# Patient Record
Sex: MALE | Employment: OTHER | URBAN - METROPOLITAN AREA
[De-identification: names, ages, dates, MRNs, and addresses within clinical notes are randomized per-mention and may not be internally consistent; named-entity substitution may affect disease eponyms.]

---

## 2018-01-01 ENCOUNTER — HOSPITAL ENCOUNTER (INPATIENT)
Age: 83
LOS: 1 days | Discharge: HOSPICE | DRG: 064 | End: 2018-11-29
Attending: EMERGENCY MEDICINE | Admitting: FAMILY MEDICINE
Payer: MEDICARE

## 2018-01-01 ENCOUNTER — HOSPITAL ENCOUNTER (OUTPATIENT)
Dept: MRI IMAGING | Age: 83
Discharge: HOME | End: 2018-11-13
Attending: FAMILY MEDICINE
Payer: MEDICARE

## 2018-01-01 ENCOUNTER — APPOINTMENT (OUTPATIENT)
Dept: CT IMAGING | Age: 83
DRG: 064 | End: 2018-01-01
Attending: EMERGENCY MEDICINE
Payer: MEDICARE

## 2018-01-01 ENCOUNTER — HOME CARE VISIT (OUTPATIENT)
Dept: HOSPICE | Facility: HOSPICE | Age: 83
End: 2018-01-01
Payer: MEDICARE

## 2018-01-01 ENCOUNTER — HOSPITAL ENCOUNTER (INPATIENT)
Age: 83
LOS: 2 days | DRG: 951 | End: 2018-12-01
Attending: INTERNAL MEDICINE | Admitting: INTERNAL MEDICINE
Payer: OTHER MISCELLANEOUS

## 2018-01-01 ENCOUNTER — HOSPICE ADMISSION (OUTPATIENT)
Dept: HOSPICE | Facility: HOSPICE | Age: 83
End: 2018-01-01
Payer: MEDICARE

## 2018-01-01 VITALS
SYSTOLIC BLOOD PRESSURE: 117 MMHG | HEART RATE: 115 BPM | TEMPERATURE: 100.2 F | OXYGEN SATURATION: 94 % | DIASTOLIC BLOOD PRESSURE: 59 MMHG | RESPIRATION RATE: 14 BRPM

## 2018-01-01 VITALS
HEART RATE: 71 BPM | RESPIRATION RATE: 14 BRPM | DIASTOLIC BLOOD PRESSURE: 98 MMHG | SYSTOLIC BLOOD PRESSURE: 192 MMHG | OXYGEN SATURATION: 91 %

## 2018-01-01 DIAGNOSIS — R40.0 SOMNOLENCE: ICD-10-CM

## 2018-01-01 DIAGNOSIS — Z51.5 COMFORT MEASURES ONLY STATUS: ICD-10-CM

## 2018-01-01 DIAGNOSIS — R53.81 DEBILITY: ICD-10-CM

## 2018-01-01 DIAGNOSIS — S32.474A: ICD-10-CM

## 2018-01-01 DIAGNOSIS — I61.9 RIGHT-SIDED NONTRAUMATIC INTRACEREBRAL HEMORRHAGE, UNSPECIFIED CEREBRAL LOCATION (HCC): Primary | ICD-10-CM

## 2018-01-01 PROCEDURE — 74011250636 HC RX REV CODE- 250/636: Performed by: NURSE PRACTITIONER

## 2018-01-01 PROCEDURE — 72195 MRI PELVIS W/O DYE: CPT

## 2018-01-01 PROCEDURE — 77030018846 HC SOL IRR STRL H20 ICUM -A

## 2018-01-01 PROCEDURE — 74011000250 HC RX REV CODE- 250: Performed by: INTERNAL MEDICINE

## 2018-01-01 PROCEDURE — 74011250636 HC RX REV CODE- 250/636: Performed by: PHYSICAL MEDICINE & REHABILITATION

## 2018-01-01 PROCEDURE — 65270000032 HC RM SEMIPRIVATE

## 2018-01-01 PROCEDURE — 74011000258 HC RX REV CODE- 258: Performed by: PHYSICAL MEDICINE & REHABILITATION

## 2018-01-01 PROCEDURE — 0656 HSPC GENERAL INPATIENT

## 2018-01-01 PROCEDURE — 3336500001 HSPC ELECTION

## 2018-01-01 PROCEDURE — 70450 CT HEAD/BRAIN W/O DYE: CPT

## 2018-01-01 PROCEDURE — 74011250636 HC RX REV CODE- 250/636: Performed by: INTERNAL MEDICINE

## 2018-01-01 PROCEDURE — G0299 HHS/HOSPICE OF RN EA 15 MIN: HCPCS

## 2018-01-01 PROCEDURE — 74011000258 HC RX REV CODE- 258: Performed by: INTERNAL MEDICINE

## 2018-01-01 PROCEDURE — 99284 EMERGENCY DEPT VISIT MOD MDM: CPT

## 2018-01-01 PROCEDURE — 74011250637 HC RX REV CODE- 250/637: Performed by: INTERNAL MEDICINE

## 2018-01-01 RX ORDER — MORPHINE SULFATE 2 MG/ML
2 INJECTION, SOLUTION INTRAMUSCULAR; INTRAVENOUS EVERY 4 HOURS
Status: DISCONTINUED | OUTPATIENT
Start: 2018-01-01 | End: 2018-01-01

## 2018-01-01 RX ORDER — ALBUTEROL SULFATE 0.83 MG/ML
2.5 SOLUTION RESPIRATORY (INHALATION)
Status: DISCONTINUED | OUTPATIENT
Start: 2018-01-01 | End: 2018-01-01 | Stop reason: HOSPADM

## 2018-01-01 RX ORDER — DULOXETIN HYDROCHLORIDE 60 MG/1
60 CAPSULE, DELAYED RELEASE ORAL DAILY
COMMUNITY
End: 2018-01-01

## 2018-01-01 RX ORDER — ACETAMINOPHEN 500 MG
1000 TABLET ORAL EVERY 12 HOURS
COMMUNITY
End: 2018-01-01

## 2018-01-01 RX ORDER — CYANOCOBALAMIN (VITAMIN B-12) 2000 MCG
2000 TABLET ORAL DAILY
COMMUNITY
End: 2018-01-01

## 2018-01-01 RX ORDER — ACETAMINOPHEN 650 MG/1
650 SUPPOSITORY RECTAL
Status: DISCONTINUED | OUTPATIENT
Start: 2018-01-01 | End: 2018-01-01 | Stop reason: HOSPADM

## 2018-01-01 RX ORDER — TRAZODONE HYDROCHLORIDE 50 MG/1
50 TABLET ORAL
COMMUNITY
End: 2018-01-01

## 2018-01-01 RX ORDER — LORAZEPAM 2 MG/ML
4 INJECTION INTRAMUSCULAR
Status: ACTIVE | OUTPATIENT
Start: 2018-01-01 | End: 2018-01-01

## 2018-01-01 RX ORDER — LORAZEPAM 2 MG/ML
2 INJECTION INTRAMUSCULAR EVERY 4 HOURS
Status: DISCONTINUED | OUTPATIENT
Start: 2018-01-01 | End: 2018-01-01 | Stop reason: HOSPADM

## 2018-01-01 RX ORDER — GLYCOPYRROLATE 0.2 MG/ML
0.2 INJECTION INTRAMUSCULAR; INTRAVENOUS
Status: DISCONTINUED | OUTPATIENT
Start: 2018-01-01 | End: 2018-01-01 | Stop reason: HOSPADM

## 2018-01-01 RX ORDER — GLYCOPYRROLATE 0.2 MG/ML
0.2 INJECTION INTRAMUSCULAR; INTRAVENOUS
Status: DISCONTINUED | OUTPATIENT
Start: 2018-01-01 | End: 2018-01-01

## 2018-01-01 RX ORDER — QUETIAPINE FUMARATE 25 MG/1
25 TABLET, FILM COATED ORAL 2 TIMES DAILY
COMMUNITY
End: 2018-01-01

## 2018-01-01 RX ORDER — SCOLOPAMINE TRANSDERMAL SYSTEM 1 MG/1
1 PATCH, EXTENDED RELEASE TRANSDERMAL
Status: DISCONTINUED | OUTPATIENT
Start: 2018-01-01 | End: 2018-01-01 | Stop reason: HOSPADM

## 2018-01-01 RX ORDER — ONDANSETRON 2 MG/ML
4 INJECTION INTRAMUSCULAR; INTRAVENOUS
Status: DISCONTINUED | OUTPATIENT
Start: 2018-01-01 | End: 2018-01-01 | Stop reason: HOSPADM

## 2018-01-01 RX ORDER — LABETALOL HYDROCHLORIDE 5 MG/ML
10 INJECTION, SOLUTION INTRAVENOUS
Status: DISCONTINUED | OUTPATIENT
Start: 2018-01-01 | End: 2018-01-01

## 2018-01-01 RX ORDER — FACIAL-BODY WIPES
10 EACH TOPICAL DAILY PRN
Status: DISCONTINUED | OUTPATIENT
Start: 2018-01-01 | End: 2018-01-01 | Stop reason: HOSPADM

## 2018-01-01 RX ORDER — LORAZEPAM 2 MG/ML
1 INJECTION INTRAMUSCULAR
Status: DISCONTINUED | OUTPATIENT
Start: 2018-01-01 | End: 2018-01-01 | Stop reason: HOSPADM

## 2018-01-01 RX ORDER — MORPHINE SULFATE 2 MG/ML
4 INJECTION, SOLUTION INTRAMUSCULAR; INTRAVENOUS
Status: DISCONTINUED | OUTPATIENT
Start: 2018-01-01 | End: 2018-01-01 | Stop reason: HOSPADM

## 2018-01-01 RX ORDER — LORAZEPAM 2 MG/ML
2 INJECTION INTRAMUSCULAR
Status: DISCONTINUED | OUTPATIENT
Start: 2018-01-01 | End: 2018-01-01 | Stop reason: HOSPADM

## 2018-01-01 RX ORDER — BISOPROLOL FUMARATE 5 MG/1
2.5 TABLET ORAL DAILY
COMMUNITY
End: 2018-01-01

## 2018-01-01 RX ORDER — KETOROLAC TROMETHAMINE 30 MG/ML
30 INJECTION, SOLUTION INTRAMUSCULAR; INTRAVENOUS
Status: DISCONTINUED | OUTPATIENT
Start: 2018-01-01 | End: 2018-01-01 | Stop reason: HOSPADM

## 2018-01-01 RX ORDER — MORPHINE SULFATE 10 MG/ML
2 INJECTION, SOLUTION INTRAMUSCULAR; INTRAVENOUS
Status: DISCONTINUED | OUTPATIENT
Start: 2018-01-01 | End: 2018-01-01 | Stop reason: HOSPADM

## 2018-01-01 RX ORDER — SODIUM CHLORIDE 0.9 % (FLUSH) 0.9 %
10 SYRINGE (ML) INJECTION AS NEEDED
Status: DISCONTINUED | OUTPATIENT
Start: 2018-01-01 | End: 2018-01-01 | Stop reason: HOSPADM

## 2018-01-01 RX ORDER — MORPHINE SULFATE 2 MG/ML
2 INJECTION, SOLUTION INTRAMUSCULAR; INTRAVENOUS
Status: DISCONTINUED | OUTPATIENT
Start: 2018-01-01 | End: 2018-01-01

## 2018-01-01 RX ORDER — MORPHINE SULFATE 2 MG/ML
4 INJECTION, SOLUTION INTRAMUSCULAR; INTRAVENOUS EVERY 4 HOURS
Status: DISCONTINUED | OUTPATIENT
Start: 2018-01-01 | End: 2018-01-01 | Stop reason: HOSPADM

## 2018-01-01 RX ORDER — SODIUM CHLORIDE 0.9 % (FLUSH) 0.9 %
SYRINGE (ML) INJECTION
Status: COMPLETED
Start: 2018-01-01 | End: 2018-01-01

## 2018-01-01 RX ORDER — GLYCOPYRROLATE 0.2 MG/ML
0.2 INJECTION INTRAMUSCULAR; INTRAVENOUS EVERY 4 HOURS
Status: DISCONTINUED | OUTPATIENT
Start: 2018-01-01 | End: 2018-01-01 | Stop reason: HOSPADM

## 2018-01-01 RX ORDER — DEXAMETHASONE SODIUM PHOSPHATE 4 MG/ML
4 INJECTION, SOLUTION INTRA-ARTICULAR; INTRALESIONAL; INTRAMUSCULAR; INTRAVENOUS; SOFT TISSUE EVERY 8 HOURS
Status: DISCONTINUED | OUTPATIENT
Start: 2018-01-01 | End: 2018-01-01 | Stop reason: HOSPADM

## 2018-01-01 RX ORDER — SODIUM CHLORIDE 0.9 % (FLUSH) 0.9 %
10 SYRINGE (ML) INJECTION
Status: DISCONTINUED | OUTPATIENT
Start: 2018-01-01 | End: 2018-01-01

## 2018-01-01 RX ORDER — ALENDRONATE SODIUM 70 MG/1
70 TABLET ORAL
COMMUNITY
End: 2018-01-01

## 2018-01-01 RX ORDER — CHOLECALCIFEROL (VITAMIN D3) 125 MCG
2000 CAPSULE ORAL DAILY
COMMUNITY
End: 2018-01-01

## 2018-01-01 RX ADMIN — GLYCOPYRROLATE 0.2 MG: 0.2 INJECTION, SOLUTION INTRAMUSCULAR; INTRAVENOUS at 15:24

## 2018-01-01 RX ADMIN — LORAZEPAM 2 MG: 2 INJECTION INTRAMUSCULAR; INTRAVENOUS at 23:27

## 2018-01-01 RX ADMIN — LEVETIRACETAM 500 MG: 100 INJECTION, SOLUTION INTRAVENOUS at 12:20

## 2018-01-01 RX ADMIN — DEXAMETHASONE SODIUM PHOSPHATE 4 MG: 4 INJECTION, SOLUTION INTRAMUSCULAR; INTRAVENOUS at 15:27

## 2018-01-01 RX ADMIN — MORPHINE SULFATE 2 MG: 2 INJECTION, SOLUTION INTRAMUSCULAR; INTRAVENOUS at 08:56

## 2018-01-01 RX ADMIN — LORAZEPAM 2 MG: 2 INJECTION INTRAMUSCULAR; INTRAVENOUS at 16:59

## 2018-01-01 RX ADMIN — LORAZEPAM 2 MG: 2 INJECTION INTRAMUSCULAR; INTRAVENOUS at 07:45

## 2018-01-01 RX ADMIN — MORPHINE SULFATE 2 MG: 2 INJECTION, SOLUTION INTRAMUSCULAR; INTRAVENOUS at 06:55

## 2018-01-01 RX ADMIN — MORPHINE SULFATE 4 MG: 2 INJECTION, SOLUTION INTRAMUSCULAR; INTRAVENOUS at 15:25

## 2018-01-01 RX ADMIN — MORPHINE SULFATE 2 MG: 2 INJECTION, SOLUTION INTRAMUSCULAR; INTRAVENOUS at 01:04

## 2018-01-01 RX ADMIN — MORPHINE SULFATE 2 MG: 2 INJECTION, SOLUTION INTRAMUSCULAR; INTRAVENOUS at 21:17

## 2018-01-01 RX ADMIN — MORPHINE SULFATE 2 MG: 2 INJECTION, SOLUTION INTRAMUSCULAR; INTRAVENOUS at 08:26

## 2018-01-01 RX ADMIN — LORAZEPAM 2 MG: 2 INJECTION INTRAMUSCULAR; INTRAVENOUS at 05:13

## 2018-01-01 RX ADMIN — MORPHINE SULFATE 4 MG: 2 INJECTION, SOLUTION INTRAMUSCULAR; INTRAVENOUS at 19:10

## 2018-01-01 RX ADMIN — GLYCOPYRROLATE 0.2 MG: 0.2 INJECTION, SOLUTION INTRAMUSCULAR; INTRAVENOUS at 07:03

## 2018-01-01 RX ADMIN — GLYCOPYRROLATE 0.2 MG: 0.2 INJECTION, SOLUTION INTRAMUSCULAR; INTRAVENOUS at 23:06

## 2018-01-01 RX ADMIN — MORPHINE SULFATE 2 MG: 2 INJECTION, SOLUTION INTRAMUSCULAR; INTRAVENOUS at 05:13

## 2018-01-01 RX ADMIN — GLYCOPYRROLATE 0.2 MG: 0.2 INJECTION, SOLUTION INTRAMUSCULAR; INTRAVENOUS at 23:27

## 2018-01-01 RX ADMIN — MORPHINE SULFATE 4 MG: 2 INJECTION, SOLUTION INTRAMUSCULAR; INTRAVENOUS at 22:03

## 2018-01-01 RX ADMIN — LORAZEPAM 2 MG: 2 INJECTION INTRAMUSCULAR; INTRAVENOUS at 14:22

## 2018-01-01 RX ADMIN — Medication 10 ML: at 06:55

## 2018-01-01 RX ADMIN — MORPHINE SULFATE 2 MG: 2 INJECTION, SOLUTION INTRAMUSCULAR; INTRAVENOUS at 02:57

## 2018-01-01 RX ADMIN — GLYCOPYRROLATE 0.2 MG: 0.2 INJECTION, SOLUTION INTRAMUSCULAR; INTRAVENOUS at 03:31

## 2018-01-01 RX ADMIN — MORPHINE SULFATE 4 MG: 2 INJECTION, SOLUTION INTRAMUSCULAR; INTRAVENOUS at 16:59

## 2018-01-01 RX ADMIN — KETOROLAC TROMETHAMINE 30 MG: 30 INJECTION, SOLUTION INTRAMUSCULAR at 04:50

## 2018-01-01 RX ADMIN — MORPHINE SULFATE 4 MG: 2 INJECTION, SOLUTION INTRAMUSCULAR; INTRAVENOUS at 12:24

## 2018-01-01 RX ADMIN — LORAZEPAM 2 MG: 2 INJECTION INTRAMUSCULAR; INTRAVENOUS at 23:06

## 2018-01-01 RX ADMIN — MORPHINE SULFATE 4 MG: 2 INJECTION, SOLUTION INTRAMUSCULAR; INTRAVENOUS at 06:22

## 2018-01-01 RX ADMIN — MORPHINE SULFATE 2 MG: 2 INJECTION, SOLUTION INTRAMUSCULAR; INTRAVENOUS at 07:44

## 2018-01-01 RX ADMIN — GLYCOPYRROLATE 0.2 MG: 0.2 INJECTION, SOLUTION INTRAMUSCULAR; INTRAVENOUS at 04:00

## 2018-01-01 RX ADMIN — LORAZEPAM 2 MG: 2 INJECTION INTRAMUSCULAR; INTRAVENOUS at 15:25

## 2018-01-01 RX ADMIN — Medication 10 ML: at 07:03

## 2018-01-01 RX ADMIN — LORAZEPAM 2 MG: 2 INJECTION INTRAMUSCULAR; INTRAVENOUS at 12:17

## 2018-01-01 RX ADMIN — LORAZEPAM 2 MG: 2 INJECTION INTRAMUSCULAR; INTRAVENOUS at 08:25

## 2018-01-01 RX ADMIN — Medication 10 ML: at 19:04

## 2018-01-01 RX ADMIN — GLYCOPYRROLATE 0.2 MG: 0.2 INJECTION, SOLUTION INTRAMUSCULAR; INTRAVENOUS at 07:44

## 2018-01-01 RX ADMIN — LORAZEPAM 2 MG: 2 INJECTION INTRAMUSCULAR; INTRAVENOUS at 06:55

## 2018-01-01 RX ADMIN — MORPHINE SULFATE 2 MG: 2 INJECTION, SOLUTION INTRAMUSCULAR; INTRAVENOUS at 05:43

## 2018-01-01 RX ADMIN — LORAZEPAM 2 MG: 2 INJECTION INTRAMUSCULAR; INTRAVENOUS at 13:32

## 2018-01-01 RX ADMIN — LORAZEPAM 2 MG: 2 INJECTION INTRAMUSCULAR; INTRAVENOUS at 12:24

## 2018-01-01 RX ADMIN — MORPHINE SULFATE 2 MG: 10 INJECTION INTRAVENOUS at 12:31

## 2018-01-01 RX ADMIN — Medication 10 ML: at 07:45

## 2018-01-01 RX ADMIN — LORAZEPAM 2 MG: 2 INJECTION INTRAMUSCULAR; INTRAVENOUS at 10:05

## 2018-01-01 RX ADMIN — MORPHINE SULFATE 2 MG: 2 INJECTION, SOLUTION INTRAMUSCULAR; INTRAVENOUS at 23:06

## 2018-01-01 RX ADMIN — LORAZEPAM 2 MG: 2 INJECTION INTRAMUSCULAR; INTRAVENOUS at 13:38

## 2018-01-01 RX ADMIN — MORPHINE SULFATE 2 MG: 2 INJECTION, SOLUTION INTRAMUSCULAR; INTRAVENOUS at 10:05

## 2018-01-01 RX ADMIN — LORAZEPAM 2 MG: 2 INJECTION INTRAMUSCULAR; INTRAVENOUS at 19:10

## 2018-01-01 RX ADMIN — Medication 10 ML: at 05:43

## 2018-01-01 RX ADMIN — MORPHINE SULFATE 2 MG: 2 INJECTION, SOLUTION INTRAMUSCULAR; INTRAVENOUS at 09:33

## 2018-01-01 RX ADMIN — MORPHINE SULFATE 2 MG: 2 INJECTION, SOLUTION INTRAMUSCULAR; INTRAVENOUS at 06:21

## 2018-01-01 RX ADMIN — LORAZEPAM 2 MG: 2 INJECTION INTRAMUSCULAR; INTRAVENOUS at 05:43

## 2018-01-01 RX ADMIN — LORAZEPAM 2 MG: 2 INJECTION INTRAMUSCULAR; INTRAVENOUS at 19:03

## 2018-01-01 RX ADMIN — LORAZEPAM 2 MG: 2 INJECTION INTRAMUSCULAR; INTRAVENOUS at 04:00

## 2018-01-01 RX ADMIN — SODIUM CHLORIDE 500 MG: 900 INJECTION, SOLUTION INTRAVENOUS at 11:40

## 2018-01-01 RX ADMIN — LORAZEPAM 2 MG: 2 INJECTION INTRAMUSCULAR; INTRAVENOUS at 03:31

## 2018-01-01 RX ADMIN — Medication 10 ML: at 22:03

## 2018-01-01 RX ADMIN — LORAZEPAM 2 MG: 2 INJECTION INTRAMUSCULAR; INTRAVENOUS at 07:03

## 2018-01-01 RX ADMIN — MORPHINE SULFATE 4 MG: 2 INJECTION, SOLUTION INTRAMUSCULAR; INTRAVENOUS at 11:30

## 2018-01-01 RX ADMIN — LORAZEPAM 2 MG: 2 INJECTION INTRAMUSCULAR; INTRAVENOUS at 10:52

## 2018-01-01 RX ADMIN — Medication 10 ML: at 06:22

## 2018-01-01 RX ADMIN — LORAZEPAM 2 MG: 2 INJECTION INTRAMUSCULAR; INTRAVENOUS at 11:30

## 2018-01-01 RX ADMIN — SODIUM CHLORIDE 500 MG: 900 INJECTION, SOLUTION INTRAVENOUS at 23:36

## 2018-01-01 RX ADMIN — GLYCOPYRROLATE 0.2 MG: 0.2 INJECTION, SOLUTION INTRAMUSCULAR; INTRAVENOUS at 11:30

## 2018-01-01 RX ADMIN — MORPHINE SULFATE 2 MG: 2 INJECTION, SOLUTION INTRAMUSCULAR; INTRAVENOUS at 19:03

## 2018-01-01 RX ADMIN — MORPHINE SULFATE 2 MG: 2 INJECTION, SOLUTION INTRAMUSCULAR; INTRAVENOUS at 10:52

## 2018-01-01 RX ADMIN — GLYCOPYRROLATE 0.2 MG: 0.2 INJECTION, SOLUTION INTRAMUSCULAR; INTRAVENOUS at 19:10

## 2018-01-01 RX ADMIN — LORAZEPAM 2 MG: 2 INJECTION INTRAMUSCULAR; INTRAVENOUS at 08:56

## 2018-01-01 RX ADMIN — MORPHINE SULFATE 4 MG: 2 INJECTION, SOLUTION INTRAMUSCULAR; INTRAVENOUS at 02:12

## 2018-01-01 RX ADMIN — MORPHINE SULFATE 2 MG: 2 INJECTION, SOLUTION INTRAMUSCULAR; INTRAVENOUS at 04:01

## 2018-01-01 RX ADMIN — LORAZEPAM 2 MG: 2 INJECTION INTRAMUSCULAR; INTRAVENOUS at 06:21

## 2018-01-01 RX ADMIN — KETOROLAC TROMETHAMINE 30 MG: 30 INJECTION, SOLUTION INTRAMUSCULAR at 12:24

## 2018-01-01 RX ADMIN — SODIUM CHLORIDE 500 MG: 900 INJECTION, SOLUTION INTRAVENOUS at 00:02

## 2018-01-01 RX ADMIN — MORPHINE SULFATE 4 MG: 2 INJECTION, SOLUTION INTRAMUSCULAR; INTRAVENOUS at 13:32

## 2018-01-01 RX ADMIN — MORPHINE SULFATE 2 MG: 2 INJECTION, SOLUTION INTRAMUSCULAR; INTRAVENOUS at 04:43

## 2018-01-01 RX ADMIN — LORAZEPAM 2 MG: 2 INJECTION INTRAMUSCULAR; INTRAVENOUS at 15:24

## 2018-01-01 RX ADMIN — GLYCOPYRROLATE 0.2 MG: 0.2 INJECTION, SOLUTION INTRAMUSCULAR; INTRAVENOUS at 19:03

## 2018-01-01 RX ADMIN — MORPHINE SULFATE 4 MG: 2 INJECTION, SOLUTION INTRAMUSCULAR; INTRAVENOUS at 14:22

## 2018-01-01 RX ADMIN — ONDANSETRON 4 MG: 2 INJECTION, SOLUTION INTRAMUSCULAR; INTRAVENOUS at 12:31

## 2018-01-01 RX ADMIN — MORPHINE SULFATE 2 MG: 2 INJECTION, SOLUTION INTRAMUSCULAR; INTRAVENOUS at 16:13

## 2018-01-01 RX ADMIN — Medication 10 ML: at 23:06

## 2018-01-01 RX ADMIN — LORAZEPAM 2 MG: 2 INJECTION INTRAMUSCULAR; INTRAVENOUS at 09:33

## 2018-01-01 RX ADMIN — LORAZEPAM 2 MG: 2 INJECTION INTRAMUSCULAR; INTRAVENOUS at 04:50

## 2018-11-29 PROBLEM — I61.9 ICH (INTRACEREBRAL HEMORRHAGE) (HCC): Status: ACTIVE | Noted: 2018-01-01

## 2018-11-29 NOTE — ED TRIAGE NOTES
Triage note: Patient arrives via EMS from [facility] due to left sided weakness and increase confusion. Called Shree prior to patients arrival, spoke with Coty, she reports LKW at 1830 last night. Reports patients baseline is alert and oriented to person only, recognized family, disoriented to place and time. Also reports  When he woke up at 8am this morning speech slurred and left sided weakness. BGL by .

## 2018-11-29 NOTE — ED PROVIDER NOTES
80 y.o. male with past medical history significant for hypertension, hypercholesterolemia, and skin cancer who presents from [address] via EMS with chief complaint of weakness. EMS states 2 hours ago pt had left sided weakness and was minimally responsive at his facility. EMS states pt's last known well was last night. EMS states pt's BS was 107 with a BP of 104/82. EMS states pt has left sided weakness, is not responding appropriately with unequal pupils. Pt is on 2.5mg of Eliquis and his last dose was last night at 2026. Pt has a DNR. There are no other acute medical concerns at this time. Full history, physical exam, and ROS unable to be obtained due to: Altered mental state. Social hx: No tobacco use, no EtOH use PCP: Mellisa Shipley MD 
 
Note written by Gregg Roberto, as dictated by Aylin Laguerre MD 9:38 AM 
 
 
 
 
 
The history is provided by the EMS personnel. The history is limited by the condition of the patient. No  was used. Past Medical History:  
Diagnosis Date  Cancer ([facility] [address] [facility]  Hypertension  Ill-defined condition   
 hypercholesterolemia Past Surgical History:  
Procedure Laterality Date  ABDOMEN SURGERY PROC UNLISTED  2009, 1994  
 bilateral hernia repair  CARDIAC SURG PROCEDURE UNLIST  2008  
 cardiac catheterization  HX HEENT    
 T&A No family history on file. Social History Socioeconomic History  Marital status:  Spouse name: Not on file  Number of children: Not on file  Years of education: Not on file  Highest education level: Not on file Social Needs  Financial resource strain: Not on file  Food insecurity - worry: Not on file  Food insecurity - inability: Not on file  Transportation needs - medical: Not on file  Transportation needs - non-medical: Not on file Occupational History  Not on file Tobacco Use  
  Smoking status: Never Smoker Substance and Sexual Activity  Alcohol use: No  
 Drug use: Not on file  Sexual activity: Not on file Other Topics Concern  Not on file Social History Narrative  Not on file ALLERGIES: Patient has no known allergies. Review of Systems Unable to perform ROS: Mental status change There were no vitals filed for this visit. Physical Exam  
Physical Examination: General appearance - awake, nonverbal 
Eyes - pupils equal and reactive, extraocular eye movements intact Neck - supple, no significant adenopathy Chest - clear to auscultation, no wheezes, rales or rhonchi, symmetric air entry Heart - normal rate, regular rhythm, normal S1, S2, no murmurs, rubs, clicks or gallops Abdomen - soft, nontender, nondistended, no masses or organomegaly Back exam - full range of motion, no tenderness, palpable spasm or pain on motion Neurological - awake, nonverbal, follows commands, dense weakness to left arm and leg Musculoskeletal - no joint tenderness, deformity or swelling Extremities - peripheral pulses normal, no pedal edema, no clubbing or cyanosis Skin - normal coloration and turgor, no rashes, no suspicious skin lesions noted MDM Number of Diagnoses or Management Options Right-sided nontraumatic intracerebral hemorrhage, unspecified cerebral location [facility]): Amount and/or Complexity of Data Reviewed Clinical lab tests: ordered and reviewed Tests in the radiology section of CPT®: ordered and reviewed Decide to obtain previous medical records or to obtain history from someone other than the patient: yes Obtain history from someone other than the patient: yes (EMS) Review and summarize past medical records: yes Discuss the patient with other providers: yes (Teleneurology, hospitalist, hospice, palliative care) Independent visualization of images, tracings, or specimens: yes Critical Care Total time providing critical care: 30-74 minutes Patient Progress Patient progress: stable Procedures 9:40 AM 
Tried to call wife, there was no answer. Called the daughter, there was no answer but left a message. Called son, there was no answer but left a message. CONSULT NOTE: 
9:42 AM Derik Duff MD spoke with Dr. Susy Powell, Consult for Tele-Neurology. Discussed available diagnostic tests and clinical findings. Dr. Susy Powell will not evaluate the patient, as he has a head bleed. 9:44 AM 
Talking to daughter in the ED. Daughter expressed wanting palliative and comfort care, no intervention. Will cancel labs and EKG. CONSULT NOTE: 
9:53 AM Derik Duff MD communicated with Dr. Lyla Cogan, Consult for hospitalist via [facility] Text. Discussed available diagnostic tests and clinical findings. Dr. Lyla Cogan will see and admit the pt.  
 
10:05 AM 
Dr. Lyla Cogan is now in ER. Discussion with hospice nurse and family at bedside.

## 2018-11-29 NOTE — PROGRESS NOTES
Admission Medication Reconciliation: 
 
Information obtained from: [facility] medication list updated with information provided by [facility] dated 11/29/18. Summary:  
 
Medications added: all except atorvastatin Medications deleted: bisoprolol-HCTZ, lisinopril Doses changed: atorvastatin 10 mg/day (versus 20 mg/day) Inpatient orders have been reviewed and no changes are needed. Chief Complaint for this Admission:  left-sided weakness, increased confusion Significant PMH/Disease States:  
Past Medical History:  
Diagnosis Date  Cancer ([facility] [address]  Hypertension  Ill-defined condition   
 hypercholesterolemia Allergies:  Patient has no known allergies. Prior to Admission Medications:  
Prior to Admission Medications Prescriptions Last Dose Informant Patient Reported? Taking? DULoxetine (CYMBALTA) 60 mg capsule 11/28/2018  Yes Yes Sig: Take 60 mg by mouth daily. QUEtiapine (SEROQUEL) 25 mg tablet 11/28/2018  Yes Yes Sig: Take 25 mg by mouth two (2) times a day. acetaminophen (TYLENOL) 500 mg tablet 11/28/2018  Yes Yes Sig: Take 1,000 mg by mouth every twelve (12) hours. alendronate (FOSAMAX) 70 mg tablet   Yes Yes Sig: Take 70 mg by mouth every seven (7) days. apixaban (ELIQUIS) 2.5 mg tablet 11/28/2018  Yes Yes Sig: Take 2.5 mg by mouth every twelve (12) hours. atorvastatin (LIPITOR) 10 mg tablet 11/28/2018  Yes Yes Sig: Take 10 mg by mouth nightly. bisoprolol (ZEBETA) 5 mg tablet 11/28/2018  Yes Yes Sig: Take 2.5 mg by mouth daily. (dose = 0.5 x 5 tablet)  
cholecalciferol, vitamin D3, 2,000 unit tab 11/28/2018  Yes Yes Sig: Take 2,000 Units by mouth daily. cyanocobalamin, vitamin B-12, 2,000 mcg tab 11/28/2018  Yes Yes Sig: Take 2,000 mcg by mouth daily. traZODone (DESYREL) 50 mg tablet 11/28/2018  Yes Yes Sig: Take 50 mg by mouth nightly. Facility-Administered Medications: None Thank you for allowing me to participate in the care of this patient. Please contact the pharmacy at  or the medication reconciliation pharmacist at  with any questions. Rudi Prieto, Pharm. D., BCPS, BCPPS

## 2018-11-29 NOTE — HOSPICE
[facility] RN note:  Consult noted. Reviewing chart. Discussed pt with CM. Plan to meet pt shortly. 12:50---In to meet with pt, wife Celeste Crow and [address] Pt appears comfortable post IV ativan, morphine, keppra and zofran. Discussed pt with Dr Gabino Norris (ED MD) and staff RN. Due to rapid decline, potential for seizure and anticipated prognosis of few to many hours, family prefer that pt admit to hospice in the hospital and not disturb pt any further. Approval for inpt admission at [facility] level of care per Dr Jason Ma. Consent forms to be signed with pt's wife. Thank you for the opportunity to care for this pt and family. Please contact hospice at [phone] with any questions or concerns.

## 2018-11-29 NOTE — PROGRESS NOTES
Spiritual Care Assessment/Progress Note [address] 
 
 
NAME: Queen Aminata      MRN: [id number] AGE: 80 y.o. SEX: male Orthodox Affiliation: Jewish  
Language: English  
 
11/29/2018     Total Time (in minutes): 15 Spiritual Assessment begun in [address] DEP through conversation with: 
  
    []Patient        [x] Family    [] Friend(s) Reason for Consult: Emergency Department visit Spiritual beliefs: (Please include comment if needed) [x] Identifies with a analy tradition:     
   [x] Supported by a analy community:        
   [] Claims no spiritual orientation:       
   [] Seeking spiritual identity:            
   [] Adheres to an individual form of spirituality:       
   [] Not able to assess:                   
 
    
Identified resources for coping:  
   [x] Prayer                           
   [] Music                  [] Guided Imagery [x] Family/friends                 [] Pet visits [] Devotional reading                         [] Unknown 
   [] Other:                                          
 
 
Interventions offered during this visit: (See comments for more details) Family/Friend(s): Affirmation of emotions/emotional suffering, Affirmation of analy, Iconic (affirming the presence of God/Higher Power), End of life issues discussed, Normalization of emotional/spiritual concerns, Prayer (assurance of) Plan of Care: 
 
 [] Support spiritual and/or cultural needs  
 [] Support AMD and/or advance care planning process [x] Support grieving process 
 [] Coordinate Rites and/or Rituals  
 [] Coordination with community clergy [] No spiritual needs identified at this time 
 [] Detailed Plan of Care below (See Comments)  [] Make referral to Music Therapy 
[] Make referral to Pet Therapy    
[] Make referral to Addiction services 
[] Make referral to [facility] 
[] Make referral to Spiritual Care Partner [] No future visits requested       
[x] Follow up visits as needed Comments:  responded to staff request for family support. Pt was not responding though family was at bedside. Pt's wife and daughter Parvez Carranza). Family mentioned palliative care support, and that they hoped it would be a smooth transition for pt.  provided pastoral listening, support, and assurance of prayer.  let family know of  availability.  follow up as needed. Wil Ross, [facility] 
 [phone])

## 2018-11-29 NOTE — ROUTINE PROCESS
TRANSFER - OUT REPORT: 
 
Verbal report given to ANDRES Coppola(name) on AZIZA Hopson  being transferred to [facility](unit) for routine progression of care Report consisted of patients Situation, Background, Assessment and  
Recommendations(SBAR). Information from the following report(s) SBAR, Kardex, Intake/Output, MAR, Recent Results and Med Rec Status was reviewed with the receiving nurse. Lines:    
 
Opportunity for questions and clarification was provided.

## 2018-11-29 NOTE — HOSPICE
[facility] RN note:  Pt with increase upper airway secretions. Staff RN to give PRN dose of IV robinul. Will schedule robinul 0.2mg IV every 4 hrs per DR Pipe Zelaya on call for hospce. Family with concern for possible head ache exhibited prior to admission. Family does not want pt to suffer in any way. Pt is restless which could indicate pain. PRN dose of IV morphine now and then scheduled every 4 hrs per Dr Pipe Zelaya. Family gathering at bedside, appreciative of private room. Education provided about end of life process, expressed understanding. [facility] Good Help to Those in Need 
[phone] Inpatient Nursing Admission Patient Name: Taye Sharpe YOB: 1924 Age: 80 y.o. Date of Hospice Admission: 11/29/2018 Hospice Attending Elected by Patient: Lane Krabbe, MD 
Primary Care Physician: Jefe Renteria MD 
Admitting RN: Nadia Dao RN : not present Level of Care (GIP/Routine/Respite): GIP Facility of Care: [facility] Patient Room: 608/02 HOSPICE SUMMARY  
ER Visits/ Hospitalizations in past year: current hospitalization for ICH Hospice Diagnosis: [facility] [I61.9] Onset Date of Hospice Diagnosis: ICH Summary of Disease Progression Leading to Hospice Diagnosis: Pt came to ED for headache and sudden change in mental status. ICH Determined by CT. Wife and family in agreement with admitting pt to hospice for symptom management through end of life. Co-Morbidities:  
Patient Active Problem List  
Diagnosis Code  ICH (intracerebral hemorrhage) ([facility]) I61.9 Diagnoses RELATED to the terminal prognosis: ICH Other Diagnoses: none know Rationale for a prognosis of life expectancy of 6 months or less if the disease follows its normal course (Disease Specific History): Taye Sharpe is a 80 y. o. who was admitted to [facility]. The patient's principle diagnosis of ICH has resulted in unresponsiveness with expected imminent death. Functionally, the patient's Palliative Performance Scale has declined over a period of hours and is estimated at 10%. Objective information that support this patients limited prognosis includes:  
 
Head CT.11/29 IMPRESSION:  
Acute right cerebral parenchymal hemorrhage with a volume of 172.6 mL, 
surrounding edema, mass effect, and leftward midline shift. The patient/family chose comfort measures with the support of Hospice. Patient meets for GIP LOC as evidenced by seizure potential, upper airway secretions, pain (HA) Prognosis estimated based on 11/29/18 clinical assessment is:  
[x] Few to Many Hours [] Hours to Days  
[] Few to Many Days  
[] Days to Weeks  
[] Few to Many Weeks  
[] Weeks to Months  
[] Few to Many Months ASSESSMENT Patient self-reports:  []  Yes    [x] No 
 
SYMPTOMS: seizures, secretions, pain SIGNS/PHYSICAL FINDINGS: unresponsive. IV patent KARNOFSKY: 10% FAST for all dementia:   
 
Learning Assessment: 
Patient  N/A Is patient willing/able to learn? What is the highest level of education completed? Learning preference (written material, demonstration, visual)? Learning barriers (ESOL, [place], poor vision)? Caregiver Is caregiver willing to learn care for patient? yes What is the highest level of education completed? Post graduate Learning preference (written material, demonstration, visual)? demonstration Learning barriers (ESOL, [place], poor vision)?   none CLINICAL INFORMATION Wt Readings from Last 3 Encounters:  
08/11/14 78.5 kg (173 lb 1 oz) 07/30/14 80.7 kg (178 lb) Ht Readings from Last 3 Encounters:  
08/11/14 6' (1.829 m)  
07/30/14 7' 2\" (2.184 m) There is no height or weight on file to calculate BMI. Visit Vitals /89 (BP 1 Location: Left arm, BP Patient Position: At rest) Pulse (!) 116 Temp 98.9 °F (37.2 °C) Resp (!) 32 SpO2 (!) 87% LAB VALUES No results found for this visit on 18 (from the past 12 hour(s)). No results found for this visit on 18 (from the past 6 hour(s)). No results found for: TP, ALBR, TALB, ALB Currently this patient has: 
[] Supplemental O2 [x] Peripheral IV  [] PICC    [] PORT  
[] Zaavla Catheter [] NG Tube   [] PEG Tube [] Ostomy   
[] AICD: Has ICD been deactivated? [] Yes [] No:______ PLAN 1. Admit GIP 2.  Seizure. Scheduled ativan 2mg IV every 4 hrs With PRN availability and 4mg IV rescue for       seizure relief 3. Upper airway secretions: scheduled scopolamine and robinul 0.2mg IV every 4 hrs. 4.  Pain head ache:  Scheduled morphine 2mg IV every 4 hrs with PRN availability Hospice Team Frequency Orders:Skilled Nurse -   Daily x 7 days /every other day x 7 days  with 5 PRN visits for symptom control. DESI  1 visit for initial assessment/evaluation for family support and need for volunteer services. Chad Anthony  1 visit for initial assessment/evaluation for spiritual support. ADVANCE CARE PLANNING (Complete in ACP Flow Sheet) Code Status: DNR Durable DNR: [x]  Yes  []  No 
Code Status Discussed/Confirmed: wish to continue with DNR order Preference for Other Life Sustaining Treatment Discussed/Confirmed:  No life continued treatment Hospitalization Preference: wish to remain in the hospital through end of life  Service: [] Yes  [x]  No      [] Unknown Appropriate for Pinning Ceremony:  [] Yes     [x] No 
Judaism: U[place]din  Home: TBD--probable cremation DISCHARGE PLANNING 1. Discharge Plan: [facility] should pt stabilize 2. Patient/Family teaching: end of life process 3. Response to patient/family teaching: expressed understanding SOCIAL/EMOTIONAL/SPIRITUAL NEEDS Spiritual Issues Identified: Psych/ Social/ Emotional Issues Identified: wife at bedside, responding appropriately to sudden terminal event. Caregiver Support: 
[] Provided information on End of Life Care  
[] Material Provided: Gone From My Sight or Journey's End  
 
CARE COORDINATION Dr. Ivana Trujillo contacted, discharge to hospice order received Dr. Elena Richardson contacted, agrees to serve as attending provider for hospice and provided verbal certification of terminal illness with life expectancy of 6 months or less. Orders for hospice admission, medications and plan of treatment received. Medication reconciliation completed. MEDS: See medication list below DME: Per hospital 
Supplies: Per hospital 
IDT communication to include MD, SN, SW, CH and support team 
 
ALLERGIES AND MEDICATIONS Allergies: No Known Allergies Current Facility-Administered Medications Medication Dose Route Frequency  LORazepam (ATIVAN) injection 2 mg  2 mg IntraVENous Q15MIN PRN  
 ketorolac (TORADOL) injection 30 mg  30 mg IntraVENous Q8H PRN  
 acetaminophen (TYLENOL) suppository 650 mg  650 mg Rectal Q4H PRN  
 scopolamine (TRANSDERM-SCOP) 1 mg over 3 days 1 Patch  1 Patch TransDERmal Q72H  bisacodyl (DULCOLAX) suppository 10 mg  10 mg Rectal DAILY PRN  
 morphine injection 2 mg  2 mg IntraVENous Q15MIN PRN  
 LORazepam (ATIVAN) injection 4 mg  4 mg IntraVENous ONCE PRN  
 [START ON 11/30/2018] levETIRAcetam (KEPPRA) 500 mg in 0.9% sodium chloride 100 mL IVPB  500 mg IntraVENous Q12H  
 LORazepam (ATIVAN) injection 2 mg  2 mg IntraVENous Q4H  
 sodium chloride (NS) flush  glycopyrrolate (ROBINUL) injection 0.2 mg  0.2 mg IntraVENous Q4H  
 morphine injection 2 mg  2 mg IntraVENous Q4H

## 2018-11-29 NOTE — ACP (ADVANCE CARE PLANNING)
Advanced Care Planning Pt has AMD and DDNR in external records under \"media\" scanned today. Primary Decision Maker [facility] Agent): Estella Powell Relationship to patient:Wife 
Phone number: 
[x] Named in a scanned document  
[] Legal Next of Kin 
[] Guardian

## 2018-11-29 NOTE — PROGRESS NOTES
Care Management - Received consult from palliative physician, Dr. Hyacinth Gaspar to send referral to [facility] for inpatient hospice. Sent referral via Geovany Stewart to [facility]. [address] and spoke with intake. She said referral is there. She gave this CM the [facility] liaison's name and number (Annalise at [phone]). Called Annalise. She will come evaluate for inpatient hospice once she receives the information from [facility]. Per chart review, patient has an inpatient admission order, which was written at 10:04 AM. DESI Barker

## 2018-11-29 NOTE — PROGRESS NOTES
Visited Mr Brittnee Mcdonough in ED-15. Patient's wife & daughter were at the bedside and patient was resting with eyes closed. Family stated that patient had been sleeping and resting well. Family denied any needs at that time. Reassured them of ongoing  availability for support. : Rev. Greta Mondragon. Jose Antonio Washington; [facility], to contact [phone] [address] call: [phone]

## 2018-11-29 NOTE — H&P
[employer] Good Help to Those in Need 
[phone] Patient Name: Jacquie Dow YOB: 1924 Date of Provider Hospice Visit: 11/30/18 Level of Care:   [x] General Inpatient (GIP)    [] Routine   [] Respite Current Location of Care: 
[x] [address] [place] [address] [] [facility] [] [id number] [] [facility] [] Hospice [facility], patient referred from: 
[] [address] [place] [address] [] [facility] [] [address] [] [facility] [] Home [] Other:  
 
Date of Original Hospice Admission: 11/30/2018 Hospice Medical Director at time of admission: Dr Sagar Bassett Principle Hospice Diagnosis: ICH Diagnoses RELATED to the terminal prognosis:  Hx of recent pelvic fractures HOSPICE SUMMARY  
  
AZIZA Farris is a 80 y. o. with a past history of cognitive impairment, recent fall earlier this month w/ mult pelvic fractures on eliquis who was admitted on 11/29/2018 from [facility] with [address]. Upon admission family reports he was still able to follow commands, now obtunded. CT head showing acute R cerebral parenchymal hemorrhage w/ edema and midline shift. Family wishes comfort measures. Pt is admitted to [address] [place] [address] level of care due to transitions of care from acute to comfort care, high risk for decline, symptoms of tachycardia, tachypnea, hypertension. Jacquie Dow is a 80y.o. year old who was admitted to [facility]. The patient's principle diagnosis has resulted in MRI 11/29/18 IMPRESSION:  
Acute right cerebral parenchymal hemorrhage with a volume of 172.6 mL, 
surrounding edema, mass effect, and leftward midline shift. Functionally, the patient's Karnofsky and/or Palliative Performance Scale has declined over a period of 1-2 days and is estimated at 10%. The patient is dependent on the following ADLs: he is dependent for all ADLs. Objective information that support this patients limited prognosis includes: The patient/family chose comfort measures with the support of Hospice.  
 
 HOSPICE DIAGNOSES  
 Active Symptoms: 1. Shortness of breath 2. Non verbal pain indicators 3. Seizures 4. Airway secretions PLAN 1. Admit GIP for symptom management, high risk for decline, IV medications given frequently and frequent assessments required, transitions of care 2. Robinul scheduled 0.2 every 4 hours 3. Keppra  500 mg IV every 12 hours 4. Ativan 2mg every 4 hours 5. Scop patch 6. Lorazepam 2mg every 15 min as needed 7. Toradol for fevers 8. Morphine IV 2mg every 15 min as needed 9. Pt has used 13 doses of morphine and 9 extra doses of ativan 10. Will adjust medications and increase morphine doses to 4mg every 4 hours and every 15 min 11.  and SW to support family needs 12. Disposition: not likely to be transferred from in Prognosis estimated based on 11/29/18 clinical assessment is:  
[x] Hours to Days   
[] Days to Weeks   
[] Other: 
 
Communicated plan of care with: Hospice Case Manager; Hospice IDT; Care Team 
 
 GOALS OF CARE Resuscitation Status: DNR Durable DNR: [x] Yes [] No 
 
Primary Decision [address] [place] [address] Ave ([address]): Aubree Spine Relationship to patient:Wife 
Phone number: 
[x] Named in a scanned document  
[] Legal Next of Kin 
[] Guardian ACP documents you current have include: 
[x] Advance Directive or Living Will 
[] Durable Do Not Resuscitate 
[] Physician Orders for Scope of Treatment (POST) [] Medical Power of  
[] Other HISTORY History obtained from: chart, SN 
 
CHIEF COMPLAINT:   
The patient is:  
[] Verbal 
[] Nonverbal 
[x] Unresponsive HPI/SUBJECTIVE:  Pt came to ED for headache and sudden change in mental status. ICH Determined by CT. Wife and family in agreement with admitting pt to hospice for symptom management through end of life. REVIEW OF SYSTEMS The following systems were: [] reviewed  [x] unable to be reviewed Positive ROS include: Constitutional: fatigue, weakness, in pain, short of breath Ears/nose/mouth/throat: increased airway secretions Respiratory:shortness of breath, wheezing Gastrointestinal:poor appetite, nausea, vomiting, abdominal pain, constipation, diarrhea Musculoskeletal:pain, deformities, swelling legs Neurologic:confusion, hallucinations, weakness Psychiatric:anxiety, feeling depressed, poor sleep Endocrine:  
 
Adult Non-Verbal Pain Assessment Score: 10/10 Face 
[] 0   No particular expression or smile 
[] 1   Occasional grimace, tearing, frowning, wrinkled forehead 
[x] 2   Frequent grimace, tearing, frowning, wrinkled forehead Activity (movement) [] 0   Lying quietly, normal position 
[] 1   Seeking attention through movement or slow, cautious movement 
[x] 2   Restless, excessive activity and/or withdrawal reflexes Guarding 
[] 0   Lying quietly, no positioning of hands over areas of body 
[] 1   Splinting areas of the body, tense 
[x] 2   Rigid, stiff Physiology (vital signs) 
[] 0   Stable vital signs [] 1   Change in any of the following: SBP > 20mm Hg; HR > 20/minute [x] 2   Change in any of the following: SBP > 30mm Hg; HR > 25/minute Respiratory 
[] 0   Baseline RR/SpO2, compliant with ventilator 
[] 1   RR > 10 above baseline, or 5% drop SpO2, mild asynchrony with ventilator 
[x] 2   RR > 20 above baseline, or 10% drop SpO2, asynchrony with ventilator FUNCTIONAL ASSESSMENT Palliative Performance Scale (PPS): 10% PSYCHOSOCIAL/SPIRITUAL ASSESSMENT Active Problems: 
  [facility] (11/29/2018) Past Medical History:  
Diagnosis Date  Cancer ([facility] [address]  Hypertension  Ill-defined condition   
 hypercholesterolemia Past Surgical History:  
Procedure Laterality Date  ABDOMEN SURGERY PROC UNLISTED  2009, 1994  
 bilateral hernia repair  CARDIAC SURG PROCEDURE UNLIST  2008  
 cardiac catheterization  HX HEENT    
 T&A Social History Tobacco Use  Smoking status: Never Smoker  Smokeless tobacco: Never Used Substance Use Topics  Alcohol use: No  
 
No family history on file. No Known Allergies Current Facility-Administered Medications Medication Dose Route Frequency  LORazepam (ATIVAN) injection 2 mg  2 mg IntraVENous Q15MIN PRN  
 ketorolac (TORADOL) injection 30 mg  30 mg IntraVENous Q8H PRN  
 acetaminophen (TYLENOL) suppository 650 mg  650 mg Rectal Q4H PRN  
 scopolamine (TRANSDERM-SCOP) 1 mg over 3 days 1 Patch  1 Patch TransDERmal Q72H  
 glycopyrrolate (ROBINUL) injection 0.2 mg  0.2 mg IntraVENous Q4H PRN  
 bisacodyl (DULCOLAX) suppository 10 mg  10 mg Rectal DAILY PRN  
 morphine injection 2 mg  2 mg IntraVENous Q15MIN PRN  
 LORazepam (ATIVAN) injection 4 mg  4 mg IntraVENous ONCE PRN  
 levETIRAcetam (KEPPRA) 500 mg in 0.9% sodium chloride 100 mL IVPB  500 mg IntraVENous Q12H  
 LORazepam (ATIVAN) injection 2 mg  2 mg IntraVENous Q4H PHYSICAL EXAM  
 
Wt Readings from Last 3 Encounters:  
08/11/14 78.5 kg (173 lb 1 oz) 07/30/14 80.7 kg (178 lb) Visit Vitals /89 (BP 1 Location: Left arm, BP Patient Position: At rest) Pulse (!) 116 Temp 98.9 °F (37.2 °C) Resp (!) 32 SpO2 (!) 87% Supplemental O2  [x] Yes  [] NO Last bowel movement: PTA Currently this patient has: 
[] Peripheral IV [] PICC  [] PORT [] ICD [] Zavala Catheter [] NG Tube   [] PEG Tube   
[] Rectal Tube [] Drain 
[] Other:  
Constitutional: pt is unresponsive Eyes: pupils equal, anicteric ENMT: no nasal discharge, moist mucous membranes Cardiovascular: regular rhythm, distal pulses intact Respiratory: breathing ++sob, symmetric Gastrointestinal: soft non-tender, +bowel sounds Musculoskeletal: no deformity, no tenderness to palpation Skin: warm, dry pale Neurologic:pt is/ not able to follow commands, pt is/ not moving all extremities Psychiatric: obtunded Pertinent Lab and or Imaging Tests: No results found for: NA, K, CL, CO2, AGAP, GLU, BUN, CREA, BUCR, GFRAA, GFRNA, CA, GFRAA No results found for: TP, ALBR, TALB, ALB Total time: 60 
Counseling / coordination time: 45 
> 50% counseling / coordination?: zuly Wilkes, NP

## 2018-11-29 NOTE — CONSULTS
Palliative Medicine Pt w/ right cerebral parenchymal hemorrhage w/ edema and midline shift. Spoke w/ NOK/mPOA Hugo Whalen and dtr Mary. They wish for no further testing, comfort only. GCS has decr since admission, rapidly changing. Treat sx. Agree w/ IP hospice admission. Consult placed to hospice team. 
 
Full note to follow.

## 2018-11-29 NOTE — PROGRESS NOTES
Bedside shift change report given to vilma (oncoming nurse) by Emiliana French (offgoing nurse). Report included the following information SBAR, Kardex, MAR and Recent Results.

## 2018-11-29 NOTE — CONSULTS
Palliative Medicine Consult Ruben: [phone] ([phone]) Patient Name: Felicia Sandoval YOB: 1924 Date of Initial Consult: 11/29/18 Reason for Consult: End stage disease Requesting Provider: Melissa Simental, ED Primary Care Physician: Mellisa Shipley MD 
 
 SUMMARY:  
Felciia Sandoval \"Ashish\" or \"\" (pt was a , is a nickname) is a 80 y. o. with a past history of cognitive impairment, recent fall earlier this month w/ mult pelvic fractures on eliquis who was admitted on 11/29/2018 from [facility] with [address]. Upon admission family reports he was still able to follow commands, now obtunded. CT head showing acute R cerebral parenchymal hemorrhage w/ edema and midline shift. Family includes wife/mPOA Chris Martino and dtr Mary at bedside- they wish for comfort measures only, no surgical or neuro consult, no further testing. Current medical issues leading to Palliative Medicine involvement include: care decisions for end stage disease. Note- pt has external records from [facility] scanned into Media today which incl AMD and DDNR. Wife Chris Martnio, dtr Mary and sons Carlos Fish and Earlene Dominguez. PALLIATIVE DIAGNOSES:  
1. Somnolent due to acute intraparenchymal hemorrhage 2. Hx of oropharyngeal dysphagia 3. Recent pelvic fx 4. Goals of care- comfort PLAN:  
1. Meet w/ wife Chris Martino and dtr Mary. Pt is not following commands any longer, per family upon coming to ED was able to do so. They are aware of large hemorrhage and do not wish for further testing or interventions but to keep pt comfortable. 2. At baseline pt had some cognitive impairments. He and his wife [facility] for about a year, moved here to be closer to Mary. Recently had a pelvic fx and was in the healthcare unit.   
3. Talk about honoring pt and keeping him comfortable, treating sx as they arise. Initially thought about going to [facility] , but given rapid decline since ED admission agree w/ IP hospice assessment- have consulted. Do tell family that the window to move pt back to [facility] would be now, but they are concerned about acute changes. 4. Family aware that if for some reason does not meet IP criteria will recommend going back to [facility]. 5. Comfort measures discussed w/ family. 6. Dexamethasone, Keppra and Ativan for sx management, seizure ppx. May be able to stop first two medications if gets a few doses of Ativan. 7. Morphine prn pain or SOB. 8. Pastoral care aware. 9. Initial consult note routed to primary continuity provider 10. Communicated plan of care with: Palliative IDT; John Richards RN  
 
 
 GOALS OF CARE / TREATMENT PREFERENCES:  
 
GOALS OF CARE: 
Patient/Health Care Proxy Stated Goals: Comfort TREATMENT PREFERENCES:  
Code Status: DNR-DDNR scanned Advance Care Planning: 
[x] The [facility] Interdisciplinary Team has updated the ACP Navigator with [address] and Patient Capacity Primary Decision Maker [facility] Agent): Rocco Manzanares Relationship to patient:Wife 
Phone number: 
[x] Named in a scanned document  
[] Legal Next of Kin 
[] Guardian Secondary Decision Maker ([facility] Alternate Health Care Agent):  
Relationship to patient: 
Phone number: 
[] Named in a scanned document  
[] Legal Next of Kin 
[] Guardian Medical Interventions: Comfort measures Other Instructions: Other: As far as possible, the palliative care team has discussed with patient / health care proxy about goals of care / treatment preferences for patient. HISTORY:  
 
History obtained from: family, chart, staff CHIEF COMPLAINT: Cannot obtain due to patient factors HPI/SUBJECTIVE: The patient is:  
[] Verbal and participatory [x] Non-participatory due to: medical condition Pt moving b/l UE non purposefully, not following commands, not speaking. Responding to pain. Clinical Pain Assessment (nonverbal scale for severity on nonverbal patients):  
Clinical Pain Assessment Severity: 0 Duration: for how long has pt been experiencing pain (e.g., 2 days, 1 month, years) Frequency: how often pain is an issue (e.g., several times per day, once every few days, constant) FUNCTIONAL ASSESSMENT:  
 
Palliative Performance Scale (PPS): PPS: 20 
 
 
 PSYCHOSOCIAL/SPIRITUAL SCREENING:  
 
Palliative IDT has assessed this patient for cultural preferences / practices and a referral made as appropriate to needs (Cultural Services, Patient Advocacy, Ethics, etc.) Any spiritual / Judaism concerns: 
[] Yes /  [x] No 
 
Caregiver Burnout: 
[] Yes /  [x] No /  [] No Caregiver Present Anticipatory grief assessment:  
[x] Normal  / [] Maladaptive REVIEW OF SYSTEMS:  
 
Positive and pertinent negative findings in ROS are noted above in HPI. The following systems were [] reviewed / [x] unable to be reviewed as noted in HPI Other findings are noted below. Systems: constitutional, ears/nose/mouth/throat, respiratory, gastrointestinal, genitourinary, musculoskeletal, integumentary, neurologic, psychiatric, endocrine. Positive findings noted below. Modified ESAS Completed by: provider Fatigue: 10 Drowsiness: 9 Pain: 0 Dyspnea: 0 PHYSICAL EXAM:  
 
From RN flowsheet: 
Wt Readings from Last 3 Encounters:  
08/11/14 173 lb 1 oz (78.5 kg) 07/30/14 178 lb (80.7 kg) Blood pressure (!) 192/98, pulse 71, resp. rate 14, SpO2 91 %. Constitutional: somnolent, eyes closed Eyes: pupils equal 
ENMT: no nasal discharge, moist mucous membranes Cardiovascular: regular rhythm Respiratory: breathing not labored, symmetric Gastrointestinal: soft non-tender, +bowel sounds Musculoskeletal: no deformity Skin: warm, dry Neurologic: moving UE nonpurposefully, not responding HISTORY:  
 
Principal Problem: 
  ICH (intracerebral hemorrhage) ([facility] (11/29/2018) Past Medical History:  
Diagnosis Date  Cancer ([facility]  Hypertension  Ill-defined condition   
 hypercholesterolemia Past Surgical History:  
Procedure Laterality Date  ABDOMEN SURGERY PROC UNLISTED  2009, 1994  
 bilateral hernia repair  CARDIAC SURG PROCEDURE UNLIST  2008  
 cardiac catheterization  HX HEENT    
 T&A No family history on file. History reviewed, no pertinent family history. Social History Tobacco Use  Smoking status: Never Smoker  Smokeless tobacco: Never Used Substance Use Topics  Alcohol use: No  
 
No Known Allergies Current Facility-Administered Medications Medication Dose Route Frequency  LORazepam (ATIVAN) injection 2 mg  2 mg IntraVENous Q15MIN PRN  
 ondansetron (ZOFRAN) injection 4 mg  4 mg IntraVENous Q4H PRN  
 glycopyrrolate (ROBINUL) injection 0.2 mg  0.2 mg IntraVENous Q4H PRN  
 dexamethasone (DECADRON) 4 mg/mL injection 4 mg  4 mg IntraVENous Q8H  
 LORazepam (ATIVAN) injection 2 mg  2 mg IntraVENous Q4H  
 morphine 10 mg/mL injection 2 mg  2 mg IntraVENous Q15MIN PRN Current Outpatient Medications Medication Sig  
 bisoprolol (ZEBETA) 5 mg tablet Take 2.5 mg by mouth daily. (dose = 0.5 x 5 tablet)  cyanocobalamin, vitamin B-12, 2,000 mcg tab Take 2,000 mcg by mouth daily.  alendronate (FOSAMAX) 70 mg tablet Take 70 mg by mouth every seven (7) days.  cholecalciferol, vitamin D3, 2,000 unit tab Take 2,000 Units by mouth daily.  acetaminophen (TYLENOL) 500 mg tablet Take 1,000 mg by mouth every twelve (12) hours.  apixaban (ELIQUIS) 2.5 mg tablet Take 2.5 mg by mouth every twelve (12) hours.  DULoxetine (CYMBALTA) 60 mg capsule Take 60 mg by mouth daily.  traZODone (DESYREL) 50 mg tablet Take 50 mg by mouth nightly.  QUEtiapine (SEROQUEL) 25 mg tablet Take 25 mg by mouth two (2) times a day.  atorvastatin (LIPITOR) 10 mg tablet Take 10 mg by mouth nightly. LAB AND IMAGING FINDINGS:  
 
No results found for: WBC, HGB, PLT, HGBEXT, PLTEXT, HGBEXT, PLTEXT No results found for: NA, K, CL, CO2, BUN, CREA, CA, MG, PHOS No results found for: SGOT, GPT, AP, TBIL, TP, ALB, GLOB, GGT No results found for: INR, PTMR, PTP, PT1, PT2, APTT No results found for: IRON, FE, TIBC, IBCT, PSAT, FERR No results found for: PH, PCO2, PO2 No components found for: [place] No results found for: CPK, CKMB Total time:  
Counseling / coordination time, spent as noted above:  
> 50% counseling / coordination?:  
 
Prolonged service was provided for  []30 min   []75 min in face to face time in the presence of the patient, spent as noted above. Time Start:  
Time End:  
Note: this can only be billed with 14939 (initial) or 59460 (follow up). If multiple start / stop times, list each separately.

## 2018-11-30 NOTE — PROGRESS NOTES
Music Therapy Assessment Emerson Olguin [id number]  [id number] 2/14/1924  80 y.o.  male Patient Telephone Number: [phone] (home) Episcopal Affiliation: Judaism  
Language: [place] Extended Emergency Contact Information Primary Emergency Contact: Marilu Burgess Address: [facility] [address] [place] [address] Home Phone: [phone] Relation: Spouse Secondary Emergency Contact: Abdoul Monreal Home Phone: [phone] Relation: Daughter Patient Active Problem List  
 Diagnosis Date Noted  [facility] 11[facility]29/2018 Date: 11/30/2018 Mental Status:   [  ] Alert [  ] Tate Bugler [  ]  Confused  [x] Minimally responsive Communication Status: [  ] Impaired Speech [  ] Nonverbal -N/A Physical Status:   [x] Oxygen in use  [  ] Hard of Hearing [  ] Vision Impaired [  ] Ambulatory  [  ] Ambulatory with assistance [  ] Non-ambulatory Music Preferences, Background: Popular music from the 1940's, including Shan Forbes. Clinical Problem addressed: Support relaxation and comfort for pt and family. Goal(s) met in session: 
Physical/Pain management (Scale of 1-10): Pre-session rating: Pt could not report; no pain indicators were observed. Post-session rating: Same as pre-session. [  ] Increased relaxation   [  ] Regulated breathing patterns [  ] Decreased muscle tension   [  ] Minimized physical distress Emotional/Psychological: 
[  ] Increased self-expression   [  ] Decreased aggressive behavior [  ] Decreased sadness   [  ] Discussed healthy coping skills [  ] Improved mood    [  ] Decreased withdrawn behavior Social: 
[  ] Decreased feelings of isolation/loneliness [  ] Positive social interaction  
[x] Provided support and/or comfort for family/friends Spiritual: 
[  ] Spiritual support    [  ] Expressed peace [  ] Expressed analy    [  ] Discussed beliefs Techniques Utilized (Check all that apply):  
[  ] Procedural support MT [  ] Music for relaxation [x] Patient preferred music 
[  ] Sapphire analysis  [x] Song choice-pt's spouse [  ] Music for validation [  ] Entrainment  [  ] Progressive muscle relax. [  ] Guided visualization [  ] Stephanie Callas  [  ] Patient instrument playing [  ] Tona Zuniga writing [  ] [employer] along   [  ] Lindsey Hare  [  ] Sensory stimulation 
[x] Active Listening  [  ] Music for spiritual support [  ] Making of CDs as gifts Session Observations:  Referral from [facility], Hospice RN. Patient (pt) was lying in bed with his eyes closed and he appeared to be comfortable. His spouse [facility], daughter, two sons, daughter-in-law and granddaughter were at bedside. This music therapist (MT) greeted them, introduced self and briefly explained role. MT asked about pt's music preferences and pt's family members affects brightened as they shared these. They agreed to have a song and pt's spouse requested The Way You Look Tonight. MT gently touched pt's shoulder while greeting him and sharing with him what he could expect to hear. MT sat at bedside softly sang and played the selected song with guitar at a slower tempo than the recording to promote relaxation. Pt was minimally responsive throughout the session. Pt's family members expressed enjoyment in the music with bright affects. They thanked MT for the session. Will follow as able. Shannon Nelson MT-BC (Music Therapist-Board Certified) Spiritual Care Department Referral-based service

## 2018-11-30 NOTE — HOSPICE
[facility] Good Help to Those in Need 
[phone] Social Work Admission NotePatient Name: Merle Chauhan YOB: 1924 Age: 80 y.o. Date of Visit: 11/30/18 Facility of Care: [facility] Patient Room: 608/02 Hospice Attending: Louise Miller MD 
Hospice Diagnosis: ICH (intracerebral hemorrhage) ([facility] [address] [I61.9] Level of Care:  
 [x]  GIP []  Respite 
 []  Routine NARRATIVE  
AZIZA Hassan. This LCSW met with pts wife Melissa Whitaker, daughter Yehuda Valdivia, son Kwasi Limon and his wife for initial SW assessment. Pt is unresponsive and appears comfortable. Pt is a 79 y/o CM with a hospice diagnosis of ICH. Pt was admitted to [facility] 11/29 from [facility] due to [place]i [place]. Pt and wife have been  for 60+ years. The couple has 3 children son Shannon Nava and daughter Yehuda Valdivia live locally, son Kwasi Limon lives in [place]. Pt is a retired . Per family pt retired in his 79s and substituted until his late [de-identified]. Pt is a [employer] . Family reports pts unit was [place], but an officer requested pt serve in another location with [facility] side. Family recalled this as meant to be as 3/4ths of pts unit was gunned down and lost their lives overseas. LCSW provided empathetic listening and support for family as they reminisced about pt and his life. Son described pt as one of the most caring and giving people he has ever known. LCSW provided affirmation of pts role as father,  and . LCSW and family discussed the last few months of pts life and the decline and debility he experienced. LCSW affirmed pts poor QOL and families honoring pts wishes. Family is very supportive. LCSW will continue to assess and honor pt and families needs. ADVANCE CARE PLANNING Code Status: DNR Durable DNR: _ Yes  _ No 
No flowsheet data found. Relationship Status: 
[]  Single    
[x]       
[]     
[]  Domestic Partner    
[]  / 
[]  Common Law 
[]   
[]  Unknown If in a relationship, name of partner/spouse:Elisa Duration of relationship: 60 + years Amish: Yarsanism  Home:  
Resources Provided:  
 
Social Work Initial Assessment Gender: 
male Race/Ethnicity: (carolyn all that apply) []  [place]) or [place] Native 
[]   
[]  Black or [place] American 
[]   or  
[]   or [place] 
[x]  Tony Stella 
[]  Unknown 
  
 Service:   
[x]  Yes  
[]  No      
[]  Unknown Appropriate for Pinning Ceremony:  
[]  Yes     
[x]  No 
Is patient using VA benefits? unknown 
[]  Yes     
[]  No 
  
Primary Language: ENGLISH []   Needed 
[]   utilized during visit Ability to express thoughts/needs/feelings 
[]  Expressed thoughts/feelings/needs without difficulty 
[]  Requires extra time and cuing 
[]  Speech limited single words 
[]  Uses only gestures (eye, blinking eye or head movement/pointing) []  Unable to express thoughts/feelings/needs (speech unintelligible or inappropriate) [x]  Unresponsive Notes:  
  
Mental Status: 
[]  Alert-oriented to:   
 []  Person   
 []  Place   
 []  Time 
[]  Comatose-responds to:  
 []   Verbal stimuli  
 []  Tactile stimuli  
 []  Painful stimuli 
[]  Forgetful 
[]  Disoriented/Confused 
[]  Lethargic 
[]  Agitated 
[x]  Other (specify):   Unresponsive Notes:  
  
Patients description of Illness/Current Health Status:   
[x]  Patient unable to discuss 
[]  Patient unwilling to discuss 
[]  (Specify) Knowledge/Understanding of Disease Process Patient:  
 []  Demonstrates knowledge/understanding of disease process 
 []  Demonstrates knowledge/understanding of treatment plan 
 []  Demonstrates knowledge/understanding of prognosis []  Demonstrates acceptance of prognosis []  Demonstrates knowledge/understanding of resuscitation status [x]  Other (specify)  Unresponsive Caregiver: [x]  Demonstrates knowledge/understanding of disease process [x]  Demonstrates knowledge/understanding of treatment plan 
 [x]  Demonstrates knowledge/understanding of prognosis [x]  Demonstrates acceptance of prognosis [x]  Demonstrates knowledge/understanding of resuscitation status 
 []  Other (specify) Notes:  
  
Patients living arrangement/care setting: 
Use the PRIOR COLUMN when the PATIENTS current health status necessitated a change in his/her primary residence. Prior Current Response  
           []             []    Patients own home/residence []             []    Home of family member/friend []             []    Boarding home  
           []             []    Assisted living facility/alf center [x]             []    Hospital/Acute care facility []             []    Skilled nursing facility []             []    Long term care facility/Nursing home  
           []             [x]    Hospice in Patient Primary Caregiver: 
[]  No Primary Caregiver Name of Primary Caregiver: TARYN Relationship or Primary Caregiver:  
 [x]  Spouse/Significant other     
 []  Natural Child      
 []  Step child     
 []  Sibling 
 []  Parent 
 []  Friend/Neighbor 
 []  Community/Christian Volunteer 
 []  Paid help 
 []  Other (specify):___________ Notes:   
  
Family members/Significant others: 
Name:TARYN Relationship:SPOUSE Phone Number: [phone] Actively involved in care? [x]  Yes  []  No 
 
Name: Almaz Gallegos Relationship: 
Phone Number: Actively involved in care? [x]  Yes  []  No 
 
Name:MOODY Relationship: SON Phone Number: Actively involved in care? []  Yes  []  No 
 
Social support systems: (select ONE best description) [x]  Excellent social support system which includes three or more family members or friends 
[]  Good social support system which includes two or less members or friends []  Fair social support which includes one family member or friend 
[]  Poor social support; no family members or friends; basically ALONE Notes:  
  
Emotional Status: (carolyn all that apply) Patient Caregiver Response [x]                [x]    Mood/Affect stable and appropriate    
              []                []    Angry  
              []                []    Anxious []                []    Apprehensive []                []    Avoidant  
              []                []    Clinging  
              []                []    Depressed  
              []                []    Distraught  
              []                []    Elated []                []    Euphoric  
              []                []    Fearful  
              []                []    Flat Affect  
              []                []    Helpless []                []    Hostile []                []    Impulsive []                []    Irritable  
              []                []    Labile  
              []                []    Manic  
              []                []    Restlessness []                []    Sad  
              []                []    Suspicious []                []    Tearful  
              []                []    Withdrawn Notes:  
 
Coping Skills (strengths/weakness):  
 Patient: Coping Skills (strength/weakness): Unresponsive Family/caregiver (strength/weakness): FAMILY IS COPING WITH SUPPORT FROM ONE ANOTHER, THEY ARE REALISTIC AND ACCEPTING.  
  
[place] of care (carolyn all that apply):    
[x]  No burden evident  
[]  Family must administer medications  
[]  Illness causing financial strain  
[]  Family/Support feels overwhelmed  
[]  Family/Support sleep disturbed with patients care  
[]  Patients care causes extra physical stress  of death []  Illness causes changes in family lifestyle 
[]  Illness impacting family/support employment 
[]  Family experiencing increased time demands 
[]  Patients behavior endangers family 
[]  Denial of patients illness 
[]  Concern over outcome of illness/fear 
[]  Patients behavior embarrassing to family Notes:  
  
Risk Factors: (carolyn all that apply):   
[x]  No burden evident  
[]  Alcohol abuse 
[]  Financial resources inadequate to meet basic needs (food/house/etc) []  Financial resources inadequate to meet health care needs (supplies/equipment/medications) 
[]  Food/nutrition resources inadequate 
[]  Home environment unsafe/inadequate for home care 
[]  Homicidal risk 
[]  Lives alone or without concerned relatives 
[]  Multiple medications/complex schedule 
[]  Physical limitations increase likelihood of falls 
[]  Plan of care/treatments complicated 
[]  Substance use/abuse 
[]  Suicidal risk 
[]  Visual impairment threatens safety/ability to perform self-care 
[]  Other (specify): 
  
Abuse/Neglect (actual/potential risks): 
[x]  No signs of abuse/neglect 
[]  History of abuse/neglect                 []  FILXCLAL          []  Sexual 
[]  History of domestic violence 
[]  Lacks adequate physical care 
[]  Lacks emotional nurturing/support 
[]  Lacks appropriate stimulation/cognitive experiences 
[]  Left alone inappropriately 
[]  Lacks necessary supervision 
[]  Inadequate or delayed medical care 
[]  Unsafe environment (i.e guns/drug use/history of violence in the home/etc.) []  Bruising or other physical signs of injury present 
[]  Other (specify): 
Notes:  
[]  Refer to child/adult protective services Current Sources of Stress (in Addition to Current Illness):  
[x]  None reported 
[]  Bills/Debt   
[]  Career/Job change   
[]   (short term)   
[]   (long term)   
[]  Death of a child (recent)   
[]  Death of a parent (recent)  
[]  Death of a spouse (recent)  
 []  Employment status changed  
[]  Family discord   
[]  Financial loss/Inadequate inther (specify):come 
[]  Job loss 
[]  Legal issues unresolved 
[]  Lifestyle change 
[]  Marital discord 
[]  Marriage within the last year 
[]  Paperwork (insurance/legal/etc) overwhelming 
[]  Separation/Divorce 
[]  Other (specify): 
Notes:  
  
Current Community Resources Being Utilized 1. Interventions/Plan of Care 1. Assess social and emotional factors related to coping with end of life issues 2. Community resource planning/referral  
3. Relocation to different care setting if/when symptoms stabilize PT WILL LIKELY PASS AT [facility] Discharge Planning PT WILL LIKELY PASS AT [facility] 
MSW Assessment Completed by: Ruby Agrawal 11/30/18 Time In: 12;30 Time Out:14;30

## 2018-11-30 NOTE — PROGRESS NOTES
Bedside shift change report given to Thierry Gutierrez RN (oncoming nurse) by Arelis Robbins RN (offgoing nurse). Report included the following information SBAR and Kardex.

## 2018-11-30 NOTE — PROGRESS NOTES
11/30/18 1897 Vital Signs Temp (!) 102.6 °F (39.2 °C) Temp Source Axillary Pulse (Heart Rate) (!) 139 Heart Rate Source Monitor Resp Rate 22  
O2 Sat (%) (!) 72 % Level of Consciousness (!) Unresponsive BP (!) 78/44 MAP (Calculated) (!) 55 BP 1 Method Automatic  
BP 1 Location Right arm BP Patient Position At rest  
MEWS Score 12 Hospice patient, actively dying

## 2018-11-30 NOTE — PROGRESS NOTES
[facility] Good Help to Those in Need 
[phone] [facility] Daily Nursing Note Patient Name: Taye Sharpe YOB: 1924 Age: 80 y.o. Date of Visit: 11/30/18 Facility of Care: [facility] Patient Room: 608/02 Hospice Attending: Lane Krabbe, MD 
Hospice Diagnosis: ICH (intracerebral hemorrhage) ([facility] [address] [I61.9] Level of Care: [facility] Current GIP Symptoms 1. Patient is unresponsive to stimuli, respirations of 32, O2 sats in 70's with hypotension and tachycardia. 2. Patient on room air, warm to touch with fever over 102, breathing is shallow. 3. Patient is very warm to touch, pulses felt, bowel sounds present. Family is surrounding bedside as patient appears to be actively dying. 4. Admitted for control of seizures, no seizure activity noted. ASSESSMENT & PLAN Must update Plan of Care including visit frequencies for IDT members 1. Patient with significant use of prn medications with scheduled. Increase morphine dose to 4mg IV scheduled every 4 hours with 4mg IV prn available for increased respirations, increased pain and discomfort. 2. Encourage RN staff to use Toradol and tylenol suppository for control of fever, ice packs can also be used, Turn and reposition patient every 4 hours for comfort. Oral care as needed. 3. Support close family as patient appears imminent. Family states they are using [facility] [address]. 4. Continue with IV keppra for control of seizures. Spiritual Interventions: None at this time Psych/ Social/ Emotional Interventions: Family was writing obituary today and sharing stories of friends and family. Care Coordination Needs: discussed increasing medication needs with Brianna Prabhakar NP Care plan and New Orders discussed / approved with Brianna Prabhakar NP Description History and Chart Review If this is initial GIP note must document RN assessment/MD communication in previous setting. Specifically document nursing/medication needs in last 24 hours to support GIP care Narrative History of last 24 hours that demonstrates care cannot be provided in another setting: 
IV medications, frequent monitoring and increased use of prn medications have required increased dosages today, patient appears imminent. What has been done to control the patient's symptoms in the last 24 hours? Increased IV morphine to 4 mg every 4 hours with prn available, IV keppra still be utilized for control of seizure activity. Does the patient currently require IV medications? yes Does the patient currently require scheduled medications? yes Does the patient currently require a PCA? no 
 
List number of doses of PRN medications in last 24 hours: 
Medication 1: Toradol Number of doses:1 Medication 2: Morphine Number of doses: 11 Medication 3: Ativan Number of doses: 9 Supporting documentation for GIP need for pain control: 
[x] Frequent evaluation by a doctor, nurse practitioner, nurse  
[x] Frequent medication adjustment   
[x] IVs that cannot be administered at home  
[] Aggressive pain management  
[] Complicated technical delivery of medications Supporting documentation for GIP need for symptom control: 
[x]  Sudden decline necessitating intensive nursing intervention 
[]  Uncontrolled / intractable nausea or vomiting  
[]  Pathological fractures 
[]  Advanced open wounds requiring frequent skilled care 
[] Unmanageable respiratory distress 
[] New or worsening delirium  
[] Delirium with behavior issues: Is 24 hour caregiver present due to safety concerns with agitation? (yes/no) [x] Imminent death  with skilled nursing needs documented above DISCHARGE PLANNING Daily discharge planning required for GIP 1. Discharge Plan: Patient will likely pass at [address] [place] [address] as he appears imminent, unsafe for transfer to another location 2. Patient/Family teaching: end of life signs and symptoms 3. Response to patient/family teaching: family is understanding and accepting. ASSESSMENT   
KARNOFSKY:10 Prognosis estimated based on 11/30/18 clinical assessment is:  
[x] Few to Many Hours [] Hours to Days  
[] Few to Many Days  
[] Days to Weeks  
[] Few to Many Weeks  
[] Weeks to Months  
[] Few to Many Months Quality Measure: Patient self-reports:  [] Yes    [x] No 
ESAS:  
Time of Assessment: 0930 Pain (1-10):7 Fatigue (1-10): 7 Shortness of breath (1-10):9 Nausea (1-10): 5 Appetite (1-10): Anxiety: (1-10): Depression: (1-10): Well-being: (1-10): Constipation: _ Yes  _x No 
LAST BM: none charted since admission yesterday CLINICAL INFORMATION Patient Vitals for the past 12 hrs: 
 Temp Pulse Resp BP SpO2  
11/30/18 0906 (!) 102.6 °F (39.2 °C) (!) 139 22 (!) 78/44 (!) 72 % Currently this patient has: 
[] Supplemental O2 [x] IV   
[] PICC [] PORT  
[] NG Tube   
[] PEG Tube  
[] Ostomy    
[] Zavala draining _______ urine 
[] Other:  
 
SIGNS/PHYSICAL FINDINGS Skin (including wound): 
[] Warm, dry, supple, intact and color normal for race [x] Warm  
[x] Dry  
[] Cool    
[] Clammy      
[] Diaphoretic Turgor 
 [] Normal 
 [x] Decreased Color:  
 [] Pink [x] Pale 
 [] Cyanotic 
 [] Erythema 
 [] Jaundice [] Normal for Race 
[]  Wounds: 
 
Neuro: 
[] Lethargy 
[] Restlessness / agitation 
[] Confusion / delirium 
[] Hallucinations 
[] Responds to maximal stimulation 
[x] Unresponsive 
[] Seizures Cardiac:[] Dyspnea on Exertion 
[] JVD [] Murmur 
[] Palpitations [x] Hypotension 
[] Hypertension 
[x] Tachycardia [] Bradycardia [x] Irregular HR [x] Pulses Decreased 
[] Pulses Absent 
[] Edema:       (Location, Grade and Pitting) [] Mottling:      (Location) Respiratory:Breath sounds:  
 [x] Diminished 
 [] Wheeze 
 [] Rhonchi 
 [] Rales  
[] Even and unlabored 
[x] Labored:    32       
 [] Cough 
 [] Non Productive 
 [] Productive 
  [] Description:          
[] Deep suctioned  
[] O2 at ___ LPM 
[] High flow oxygen greater than 10 LPM 
[] Bi-Pap GI [x] Abdomen (describe) flat, soft 
[] Ascites 
[] Nausea 
[] Vomiting 
[x] Incontinent of bowels [x] Bowel sounds (yes/no) [] Diarrhea 
[] Constipation (see above including last bowel movement) [] Checked for impaction 
[] Last BM none charted since admission yesterday Nutrition Diet:__NPO________ Appetite:  
[] Good  
[] Fair  
[] Poor  
[] Tube Feeding  
[] Voiding 
[x] Incontinent  
[] Zavala Musculoskeletal 
[] Balance/[place] Unsteady [x] Weak Strength:  
 [] Normal  
 [] Limited [x] Decreasing Activities:  
 [] Up as tolerated 
 [] Bedridden  
 [] Specify: 
 
SAFETY [] 24 hr. Caregiver [x] Side rails ? [x] Hospital bed  
[] Reviewed Falls & Safety ALLERGIES AND MEDICATIONS Allergies: No Known Allergies Current Facility-Administered Medications Medication Dose Route Frequency  LORazepam (ATIVAN) injection 2 mg  2 mg IntraVENous Q15MIN PRN  
 ketorolac (TORADOL) injection 30 mg  30 mg IntraVENous Q8H PRN  
 acetaminophen (TYLENOL) suppository 650 mg  650 mg Rectal Q4H PRN  
 scopolamine (TRANSDERM-SCOP) 1 mg over 3 days 1 Patch  1 Patch TransDERmal Q72H  bisacodyl (DULCOLAX) suppository 10 mg  10 mg Rectal DAILY PRN  
 morphine injection 2 mg  2 mg IntraVENous Q15MIN PRN  
 LORazepam (ATIVAN) injection 4 mg  4 mg IntraVENous ONCE PRN  
 levETIRAcetam (KEPPRA) 500 mg in 0.9% sodium chloride 100 mL IVPB  500 mg IntraVENous Q12H  
 LORazepam (ATIVAN) injection 2 mg  2 mg IntraVENous Q4H  
 glycopyrrolate (ROBINUL) injection 0.2 mg  0.2 mg IntraVENous Q4H  
 morphine injection 2 mg  2 mg IntraVENous Q4H  
 saline peripheral flush soln 10 mL  10 mL InterCATHeter PRN Visit Time In: 9411 Visit Time Out: 1000

## 2018-12-01 NOTE — PROGRESS NOTES
Pt deep suctioned. Large amount of thick, yellow mucus suctioned out of throat. Sat dropped to low 70s,  Pt placed on NRB and sat came up to 85. RN notified and was administering morphine.

## 2018-12-01 NOTE — PROGRESS NOTES
Notified by nurse of the death of Mr Anirudh Arellano in room 608/02. Patient's daughter and two sons were at the bedside when  arrived. Offered condolences and active listening as family reflected over patient's life and his time since admission. Daughter was a little tearful but she and his sons shared how grateful they were for all that the [facility] staff had done since patient's admission. Family stated that they had no specific needs at that time. They said that they would contact [facility] as services would be held in [place], [place]. With family's permission, had prayer of commendation and assured them of prayers on their behalf. : Rev. Claudia Peterson. Rainer Madrid; [facility], to contact [phone] call: [phone]

## 2018-12-01 NOTE — PROGRESS NOTES
Family came to nurses station stating, \"My father hasn't taken a breath in over 2 minutes, I think he has passed\". Dr Tripathi Ahr pronounced death of patient. Family at bedside.

## 2018-12-01 NOTE — PROGRESS NOTES
Bedside and Verbal shift change report given to Brooke Godinez (oncoming nurse) by Day Cagle (offgoing nurse). Report included the following information SBAR.

## 2018-12-01 NOTE — PROGRESS NOTES
Called to examine patient who has . No response to verbal and tactile stimuli. No respiratory effort. Absent heart sounds and pulses. Pupils fixed and dilated. Patient pronounced dead at 7:48 AM hours.

## 2018-12-01 NOTE — PROGRESS NOTES
11/30/18 2003 Vitals Temp 100.2 °F (37.9 °C) Temp Source Axillary Pulse (Heart Rate) (!) 115 Heart Rate Source Monitor Resp Rate 14  
O2 Sat (%) 94 % Level of Consciousness (!) Unresponsive /59 MAP (Calculated) 78 BP 1 Location Right arm BP 1 Method Automatic  
BP Patient Position At rest  
MEWS Score 5 Pt comfort care/hospice 12/1 @ 0535- Pt lungs crackly/wet. Respiratory called for deep suctioning 7850- Respiratory team in room

## 2018-12-02 PROCEDURE — 0656 HSPC GENERAL INPATIENT

## 2018-12-03 ENCOUNTER — HOME CARE VISIT (OUTPATIENT)
Dept: HOSPICE | Facility: HOSPICE | Age: 83
End: 2018-12-03
Payer: MEDICARE

## 2018-12-03 PROCEDURE — 0656 HSPC GENERAL INPATIENT

## 2018-12-03 NOTE — HOSPICE
[facility] Good Help to Those in Need 
[phone] Discharge/Death Nursing Note Patient Name: Jacquie Dow YOB: 1924 Age: 80 y.o. Date of Death: 18 Admitted Date: 2018 Time of Death:  Facility of Care: [facility] Level of Care: [facility] Patient Room: 608/ Hospice Attending: Dr. Pita Ahuja Hospice Diagnosis: ICH (intracerebral hemorrhage) ([facility] [address] [I61.9] Death Pronouncement Pronouncement of death completed by: Dr. Bibiana Greco Agency staff (WAS NOT) present at the time of death At the time of death the patient was documented as Called to examine patient who has . No response to verbal and tactile stimuli. No respiratory effort. Absent heart sounds and pulses. Pupils fixed and dilated. Patient pronounced dead at 7:48 AM hours. The pt  within hospital under hospice setting. The following were notified of the patient's death: Medical Examiner, supervisor, ,  Read. Per RN staff note: Received phone call from [address] at the medical examiner's office. Head CT to be faxed. Medications were disposed of per facility protocol Discharge Summary Discharge Reason: Death Summary of Care Provided: 
 
[x] Post mortem care provided by hospital staff [x] Notification of  home by hospital nursing supervisor 
[] Referrals/Community resources provided:  
[x] Goals completed 
[] Durable Medical Equipment vendor notified Disciplines involved: [x] RN [x] SW [x]  Attending Physician, Dr. Sagar Bassett, notified of death Bereaved Damon Suárez Spouse [phone] Charito Fonseca Daughter [phone] Trish Perez RN

## 2018-12-03 NOTE — H&P
[address]  HISTORY AND PHYSICAL Kamryn Barr 
MR#: [id number] : 1924 ACCOUNT #: [de-identified] ADMIT DATE: 2018 CHIEF COMPLAINT:  Altered mental status. HISTORY OF PRESENT ILLNESS:  The patient is a 77-year-old gentleman with a past medical history of hypertension, hypercholesterolemia, skin cancer. The family also reports that about 2-3 weeks back, patient had a fall and \"cracked his acetabulum. The doctor was concerned about a clot and he was put on Eliquis. \"  Patient comes in today with confusion and altered mental status, is barely responsive. Per family, they got a call this morning that the patient since last night has been weak and has been having a left-sided facial droop and has been minimally responsive. The last known well time was last night. The family reports that he has been having a gradual decline with decreased mental acuity and decreased responsiveness for the past few days. The patient came to the ER, had a CT scan done which showed acute right cerebral parenchymal hemorrhage with volume of 172.6 mL, apparently edema and mass effect and leftward midline shift. Patient was requested to be admitted under the hospitalist service. No further history can be obtained from the patient. He is  minimally responsive and only responds minimally to sternal rub. PAST MEDICAL HISTORY:  See above. HOME MEDICATIONS:  Currently, the patient is on Eliquis, unknown dose, lisinopril 20 mg daily, Ziac 5.6/25 mg daily, Lipitor 20 mg daily. SOCIAL HISTORY:  No history of tobacco abuse, alcohol use, IV drug abuse. Used to be a . CODE STATUS:  DNR. ALLERGIES:  NO KNOWN DRUG ALLERGIES. FAMILY HISTORY:  Cannot be obtained from the patient. REVIEW OF SYSTEMS:  Cannot be obtained from the patient.  
 
PHYSICAL EXAMINATION: 
VITAL SIGNS:  Temperature is not available, pulse rate of 71, respiratory rate of 14, blood pressure 192/98, pulse ox 92% on room air. GENERAL:  Minimally responsive. HEENT:  Pupils equal; dilated, right greater than the left. Tympanic membranes clear. NECK:  Supple. No JVD. LUNGS:  Clear to auscultation bilaterally. CORONARY:  S1, S2 heard. ABDOMEN:  Soft, nontender, nondistended. Bowel sounds are physiologic. EXTREMITIES:  No clubbing, no cyanosis, no edema. NEUROPSYCHIATRIC:  Minimal exam.  DTR 1+/4. The rest of the exam could not be performed as the patient is not responsive. SKIN:  Warm. LABORATORY DATA:  Not available. CT of the head shows acute right cerebral parenchymal hemorrhage with volume of 172.6 and mild surrounding edema, mass effect and leftward midline shift. ASSESSMENT AND PLAN: 
1. Acute intracranial hemorrhage. Patient will be admitted to the hospital. I  had a long discussion with the family regarding his plan of care with daughter, Bonnielee Nissen and the patient's wife who is present at the bedside. They want no acute intervention. They specifically do not want any lab draws. No surgical intervention. No repeat imaging. No blood pressure control. No IV medication or fluids. No neuro checks or any acute intervention. They report that patient has had a good life and they just want comfort care for him and \"let nature take its own course. \"  I discussed with them that the patient is acutely ill and is extremely high risk for mortality and may not survive beyond a few hours and they are comfortable with that. The family both said patient always wanted to be DNR and he did not want any acute intervention. At this point in time, will provide comfort care. Will provide seizure precautions, Ativan p.r.n.; pain control. Palliative care consult and continue to closely monitor. Further intervention will be per hospital course. Again, patient is a very high risk for bleeding.   The family do not want any reversal agents like Kcentra. We will continue to closely monitor. 2.  History of hypertension. Patient on comfort care, no oral medications. Family does not want any blood pressure control with IV or oral medications. Continue to monitor. 3.  Gastrointestinal and deep vein thrombosis prophylaxis. Patient is COMFORT CARE ONLY. 4.  Patient is at high risk for mortality and may not survive beyond of few hours. Family aware and understand the risk of not undergoing any active intervention. Sunil Morales MD MM/MN 
D: 11/29/2018 10:34    
T: 12/02/2018 21:09 
JOB #: [id number]

## 2018-12-04 PROCEDURE — 0656 HSPC GENERAL INPATIENT

## 2018-12-05 PROCEDURE — 0656 HSPC GENERAL INPATIENT

## 2018-12-05 NOTE — DISCHARGE SUMMARY
Document: [id number]       Discharge Summary [facility] Help to Those in Need 
[phone] Date of Admission: 11/29/2018 Date of Discharge: 12/1/2018 Bridgette Urbina is a 80y.o. year old who was admitted to [facility] with a Hospice diagnosis of [address] (intracerebral hemorrhage) ([address] [I61.9]. Pt was admitted for [facility] level care. Per HPI Lonny Baldo Brenner is B 89 y. o. with a past history of cognitive impairment, recent fall earlier this month w/ mult pelvic fractures on eliquis who was admitted on 11/29/2018 from [facility] with AMS. Upon admission family reports he was still able to follow commands, now obtunded. CT head showing acute R cerebral parenchymal hemorrhage w/ edema and midline shift. Family wishes comfort measures. Pt is admitted to [facility] level of care due to transitions of care from acute to comfort care, high risk for decline, symptoms of tachycardia, tachypnea, hypertension.  
  
  
AZIZA Leger is a 80y.o. year old who was [facility] to [facility].  
  
The patient's principle diagnosis has resulted in MRI 11/29/18 IMPRESSION:  
Acute right cerebral parenchymal hemorrhage with a volume of 172.6 mL, 
surrounding edema, mass effect, and leftward midline shift. 
  
Functionally, the patient's Karnofsky and/or Palliative Performance Scale has declined over a period of 1-2 days and is estimated at 10%. The patient is dependent on the following ADLs: he is dependent for all ADLs.   
Objective information that support this patients limited prognosis includes:   
  
The patient/family chose comfort measures with the support of Hospice. 
  
 HOSPICE DIAGNOSES Active Symptoms: 1. Shortness of breath 2. Non verbal pain indicators 3. Seizures 4. Airway secretions 
  The patient's care was focused on comfort and the patient passed away on 12/1/2018.

## 2018-12-06 ENCOUNTER — HOME CARE VISIT (OUTPATIENT)
Dept: HOSPICE | Facility: HOSPICE | Age: 83
End: 2018-12-06
Payer: MEDICARE

## 2018-12-06 PROCEDURE — 0656 HSPC GENERAL INPATIENT

## 2018-12-07 PROCEDURE — 0656 HSPC GENERAL INPATIENT

## 2018-12-08 PROCEDURE — 0656 HSPC GENERAL INPATIENT

## 2018-12-09 PROCEDURE — 0656 HSPC GENERAL INPATIENT

## 2018-12-10 PROCEDURE — 0656 HSPC GENERAL INPATIENT

## 2018-12-11 PROCEDURE — 0656 HSPC GENERAL INPATIENT

## 2018-12-12 PROCEDURE — 0656 HSPC GENERAL INPATIENT

## 2018-12-13 PROCEDURE — 0656 HSPC GENERAL INPATIENT

## 2018-12-14 PROCEDURE — 0656 HSPC GENERAL INPATIENT

## 2018-12-15 PROCEDURE — 0656 HSPC GENERAL INPATIENT

## 2018-12-16 PROCEDURE — 0656 HSPC GENERAL INPATIENT

## 2018-12-17 PROCEDURE — 0656 HSPC GENERAL INPATIENT

## 2018-12-18 PROCEDURE — 0656 HSPC GENERAL INPATIENT

## 2018-12-19 PROCEDURE — 0656 HSPC GENERAL INPATIENT

## 2018-12-20 PROCEDURE — 0656 HSPC GENERAL INPATIENT

## 2018-12-21 PROCEDURE — 0656 HSPC GENERAL INPATIENT

## 2018-12-22 PROCEDURE — 0656 HSPC GENERAL INPATIENT

## 2018-12-23 PROCEDURE — 0656 HSPC GENERAL INPATIENT

## 2018-12-24 PROCEDURE — 0656 HSPC GENERAL INPATIENT

## 2018-12-25 PROCEDURE — 0656 HSPC GENERAL INPATIENT

## 2018-12-26 PROCEDURE — 0656 HSPC GENERAL INPATIENT

## 2018-12-27 PROCEDURE — 0656 HSPC GENERAL INPATIENT

## 2018-12-28 PROCEDURE — 0656 HSPC GENERAL INPATIENT

## 2018-12-29 PROCEDURE — 0656 HSPC GENERAL INPATIENT

## 2018-12-30 PROCEDURE — 0656 HSPC GENERAL INPATIENT

## 2018-12-31 PROCEDURE — 0656 HSPC GENERAL INPATIENT

## 2019-01-01 PROCEDURE — 0656 HSPC GENERAL INPATIENT

## 2019-01-02 PROCEDURE — 0656 HSPC GENERAL INPATIENT

## 2019-01-07 ENCOUNTER — DOCUMENTATION ONLY (OUTPATIENT)
Dept: NEUROLOGY | Age: 84
End: 2019-01-07

## 2020-05-01 NOTE — PROGRESS NOTES
Chart accessed for clinical data abstraction; Data qualifies for -[address] [place] [address]; No personal identifiers were obtained or shared;  No consent required - minimal risk 97.6

## 2023-02-18 NOTE — PHYSICIAN ADVISORY
Chart reviewed for shortstay status. This is a medicare/CMS exception as pt is being evaluated for discharge to hospice care. You Contreras MD MPH FACP Physician [address] [place] [address]   
Cell  [phone] rolling walker